# Patient Record
Sex: MALE | Race: WHITE | NOT HISPANIC OR LATINO | Employment: UNEMPLOYED | URBAN - METROPOLITAN AREA
[De-identification: names, ages, dates, MRNs, and addresses within clinical notes are randomized per-mention and may not be internally consistent; named-entity substitution may affect disease eponyms.]

---

## 2017-12-30 ENCOUNTER — OFFICE VISIT (OUTPATIENT)
Dept: URGENT CARE | Facility: PHYSICIAN GROUP | Age: 57
End: 2017-12-30

## 2017-12-30 VITALS
HEART RATE: 74 BPM | TEMPERATURE: 99.8 F | OXYGEN SATURATION: 96 % | RESPIRATION RATE: 16 BRPM | DIASTOLIC BLOOD PRESSURE: 84 MMHG | SYSTOLIC BLOOD PRESSURE: 108 MMHG

## 2017-12-30 DIAGNOSIS — M25.512 LEFT SHOULDER PAIN, UNSPECIFIED CHRONICITY: ICD-10-CM

## 2017-12-30 DIAGNOSIS — R21 RASH: ICD-10-CM

## 2017-12-30 DIAGNOSIS — J02.9 PHARYNGITIS, UNSPECIFIED ETIOLOGY: ICD-10-CM

## 2017-12-30 DIAGNOSIS — R07.9 CHEST PAIN, UNSPECIFIED TYPE: Primary | ICD-10-CM

## 2017-12-30 LAB
FLUAV+FLUBV AG SPEC QL IA: NORMAL
INT CON NEG: NORMAL
INT CON NEG: NORMAL
INT CON POS: NORMAL
INT CON POS: NORMAL
S PYO AG THROAT QL: NORMAL

## 2017-12-30 PROCEDURE — 87804 INFLUENZA ASSAY W/OPTIC: CPT | Performed by: PHYSICIAN ASSISTANT

## 2017-12-30 PROCEDURE — 99203 OFFICE O/P NEW LOW 30 MIN: CPT | Performed by: PHYSICIAN ASSISTANT

## 2017-12-30 PROCEDURE — 87880 STREP A ASSAY W/OPTIC: CPT | Performed by: PHYSICIAN ASSISTANT

## 2017-12-30 RX ORDER — TRIAMCINOLONE ACETONIDE 1 MG/G
1 OINTMENT TOPICAL 2 TIMES DAILY
Qty: 60 G | Refills: 1 | Status: SHIPPED | OUTPATIENT
Start: 2017-12-30 | End: 2018-01-13

## 2017-12-30 RX ORDER — HYDROXYZINE HYDROCHLORIDE 25 MG/1
25 TABLET, FILM COATED ORAL 3 TIMES DAILY PRN
Qty: 30 TAB | Refills: 0 | Status: SHIPPED | OUTPATIENT
Start: 2017-12-30

## 2017-12-30 RX ORDER — HYDROXYZINE HYDROCHLORIDE 25 MG/1
25 TABLET, FILM COATED ORAL 3 TIMES DAILY PRN
Qty: 30 TAB | Refills: 0 | Status: CANCELLED | OUTPATIENT
Start: 2017-12-30

## 2017-12-30 ASSESSMENT — ENCOUNTER SYMPTOMS
GASTROINTESTINAL NEGATIVE: 1
EYES NEGATIVE: 1
SORE THROAT: 1
COUGH: 1

## 2017-12-31 NOTE — PROGRESS NOTES
Subjective:      Luis Handley is a 57 y.o. male who presents with Chest Pain (C/o chest pain from front to back, pain radiating down LT arm with numbness, started yesterday but worse today.) and Rash (C/o rash on chest, back, legs and arms.  Slight itchiness, no pain x1 day)            HPI  Chief Complaint   Patient presents with   • Chest Pain     C/o chest pain from front to back, pain radiating down LT arm with numbness, started yesterday but worse today.   • Rash     C/o rash on chest, back, legs and arms.  Slight itchiness, no pain x1 day       HPI:  Luis Handley is a 57 y.o. male who presents with chest pain and left sided arm pain.  Rash on torso started yesterday, itchy.  Chest pain started around 1pm and has kristy worsening.  Lots arm pain.  Also having headache on left side.  Feeling sweaty.  Hx anxiety attacks.  Also having sore throat today.  Dizzy.  No runny nose.  Some body aches.  Run down.  Some cough.    No flu vaccine.    Patient denies fever, chills, or n/v/d.    Rash started yesterday for which Benadryl 4. Beginning to itch more today. Started on his chest now on his back. No change in medications or new medications.    Past Medical History:   Diagnosis Date   • Cancer (CMS-HCC)     testicular 1998   • Psychiatric disorder     anxiety       Past Surgical History:   Procedure Laterality Date   • STUMP REVISION Left 4/13/2016    Procedure: STUMP REVISION- Thumb ;  Surgeon: Clifford Weaver M.D.;  Location: SURGERY [facility];  Service:        History reviewed. No pertinent family history.  No pertinent family history.    Social History     Social History   • Marital status:      Spouse name: N/A   • Number of children: N/A   • Years of education: N/A     Occupational History   • Not on file.     Social History Main Topics   • Smoking status: Former Smoker   • Smokeless tobacco: Never Used   • Alcohol use Yes   • Drug use: No   • Sexual activity: Not on file     Other  "Topics Concern   • Not on file     Social History Narrative    ** Merged History Encounter **              Current Outpatient Prescriptions:   •  DiphenhydrAMINE HCl (BENADRYL PO), Take  by mouth.  •  omeprazole, 20 mg, Oral, DAILY, > 1 week at D/C  •  oxycodone-acetaminophen, 1 Tab, Oral, Q4HRS PRN  •  Ranitidine HCl (ZANTAC 75 PO), 1 Tab, Oral, BID, 4/13/2016 at 0800    Allergies   Allergen Reactions   • Pcn [Penicillins] Unspecified     Pt states \"As a child, I go into convulsions\".     • Morphine      \"sensitivity\"        Review of Systems   Constitutional: Positive for malaise/fatigue.   HENT: Positive for sore throat.    Eyes: Negative.    Respiratory: Positive for cough.    Cardiovascular: Positive for chest pain.   Gastrointestinal: Negative.    Genitourinary: Negative.    Skin: Positive for rash.          Objective:     /84   Pulse 74   Temp 37.7 °C (99.8 °F)   Resp 16   SpO2 96%      Physical Exam       Nursing note and vital signs reviewed.    Constitutional:  Poorly groomed, pleasant affect, and in no acute distress.    HEENT:  Head: Atruamatic, normocephalic.    Ears:  EAC with mild cerumen bilaterally, not erythematous.  TM’s pearly gray with cone of light present and umbo and malleolus visible bilaterally.  No bulging or fluid bubbles present in middle ear.    Eyes:  PERRLA, EOM's full, conjunctivae clear, fundi grossly normal, sclera white, conjunctiva not erythematous, and medial canthus without exudate bilaterally.    Nose:  Nares patent bilaterally.  Nasal mucosa edematous with clear rhinorrhea bilaterally.  Frontal and maxillary sinuses not tender to percussion.    Throat:  Oropharynx not erythematous, with no enlargement of the palatine tonsils bilaterally with no exudates.    Posterior oropharynx with cobblestoning and clear to white post nasal drainage present.  Soft palate rises symmetrically bilaterally and uvula midline.  Neck supple, with mild proximal anterior cervical chain " lymphadenopathy that is soft and mobile to palpation.      Neck: Neck supple, with mild anterior lymphadenopathy that is soft and mobile to palpation. Thyroid non-palpable without tenderness or nodules. No supraclavicular lymphadenopathy.    Lungs:  Respiratory effort not labored without accessory muscle use.  Lungs clear to auscultation bilaterally without wheezes, rales, or rhonchi.    Heart:  RRR, without murmurs rubs or gallops. Carotid arteries without bruits bilaterally. Radial and dorsalis pedis pulse 2+ bilaterally.  Chest nontender to palpation. No LE edema.    Abdomen:  Soft to palpation, no ttp.    Musculoskeletal:  Left shoulder:  No swelling, erythema, ecchymosis, effusion, or atrophy present.  TTP over the AC joint.    Limited ROM for extension, flexion, abduction, and adduction and internal rotation reaching to left hip and external rotation reaching to the occiput.    Strength 5/5 for resisted abduction, adduction, elbow flexion,  strength, and interdigital strength.  Impingement sign positive.  Adson's present.  Sensation intact to light touch C6-8.      Gait non-antalgic with a narrow base.    Derm:  Skin macular papular rough feeling rash to anterior torso and back. Lesions several millimeters in diameter. No scaling. Dry.    Psychiatric:  Mood, affect, and judgement appropriate.     EKG: NSR rate76:   , normal axis, normal intervals, no evidence of ischemia or hypertrophy.   Assessment/Plan:     1. Chest pain, unspecified type  EKG - Clinic performed    UC AMA/Refusal of Treatment   2. Pharyngitis, unspecified etiology  POCT Rapid Strep A    POCT Influenza A/B    EKG - Clinic performed   3. Rash  hydrOXYzine HCl (ATARAX) 25 MG Tab    triamcinolone acetonide (KENALOG) 0.1 % Ointment   4. Left shoulder pain, unspecified chronicity  REFERRAL TO ORTHOPEDICS      Patient presents with chest pain that he describes as sternal in nature with radiation to the back also with sore throat, dizziness, and  malaise that started yesterday. Rash also started yesterday that is pruritic. Chest pain with radiation to left arm but patient states he has been having ongoing left arm pain for some weeks that seems to be worsened with sleeping on that side loss of blood flow and numbness. On exam patient is poorly groomed but not acute appearing. Not diaphoretic. Somewhat flushed-appearing with a macular papular rash to chest and back. Heart is regular rate and rhythm. Oropharynx erythematous is some coryza with clear rhinorrhea noted. EKG reviewed by me with no ST elevation the rate of 76. No previous labs for comparison. Left arm pain with point tenderness over the AC joint and a positive Adson's, concerning for thoracic outlet syndrome vs mass occupying lesion/malignancy. Referred patient to orthopedics for further evaluation and management. Did discuss ER transfer as patient may be having an NSTEMI, and patient refused. Rapid flu and strep negative.     Advised patient to seek emergency room attention due to concern for crushing chest pain that patient has been experiencing for at least the last 4 hours. Patient refused to go to the ER, states he will go tomorrow. Had him sign AMA and discussed concerns for a non-ST elevation heart attack. Given the rash and left arm pain that has been going on for months, overall diagnosis is unclear. Plan to treat rash with topical steroid and Atarax.    Patient was in agreement with this treatment plan and seemed to understand without barriers. All questions were encouraged and answered.  Reviewed signs and symptoms of when to seek emergency medical care.     Please note that this dictation was created using voice recognition software.  I have made every reasonable attempt to correct obvious errors, but I expect there are errors of marisa and possibly content that I did not discover before finalizing the note.

## 2023-03-10 ENCOUNTER — APPOINTMENT (OUTPATIENT)
Dept: RADIOLOGY | Facility: MEDICAL CENTER | Age: 63
End: 2023-03-10
Attending: INTERNAL MEDICINE
Payer: OTHER GOVERNMENT

## 2023-03-10 ENCOUNTER — APPOINTMENT (OUTPATIENT)
Dept: RADIOLOGY | Facility: MEDICAL CENTER | Age: 63
End: 2023-03-10
Attending: EMERGENCY MEDICINE
Payer: OTHER GOVERNMENT

## 2023-03-10 ENCOUNTER — ANESTHESIA (OUTPATIENT)
Dept: SURGERY | Facility: MEDICAL CENTER | Age: 63
End: 2023-03-10
Payer: OTHER GOVERNMENT

## 2023-03-10 ENCOUNTER — HOSPITAL ENCOUNTER (OUTPATIENT)
Facility: MEDICAL CENTER | Age: 63
End: 2023-03-10
Attending: EMERGENCY MEDICINE | Admitting: STUDENT IN AN ORGANIZED HEALTH CARE EDUCATION/TRAINING PROGRAM
Payer: OTHER GOVERNMENT

## 2023-03-10 ENCOUNTER — ANESTHESIA EVENT (OUTPATIENT)
Dept: SURGERY | Facility: MEDICAL CENTER | Age: 63
End: 2023-03-10
Payer: OTHER GOVERNMENT

## 2023-03-10 VITALS
RESPIRATION RATE: 18 BRPM | DIASTOLIC BLOOD PRESSURE: 81 MMHG | HEART RATE: 76 BPM | TEMPERATURE: 98.1 F | BODY MASS INDEX: 27.07 KG/M2 | WEIGHT: 193.34 LBS | OXYGEN SATURATION: 90 % | SYSTOLIC BLOOD PRESSURE: 121 MMHG | HEIGHT: 71 IN

## 2023-03-10 DIAGNOSIS — T18.108A ESOPHAGEAL FOREIGN BODY, INITIAL ENCOUNTER: Primary | ICD-10-CM

## 2023-03-10 DIAGNOSIS — T18.128A CHICKEN BONE IN ESOPHAGUS: ICD-10-CM
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
## 2023-03-10 LAB
ANION GAP SERPL CALC-SCNC: 15 MMOL/L (ref 7–16)
BASOPHILS # BLD AUTO: 0.8 % (ref 0–1.8)
BASOPHILS # BLD: 0.05 K/UL (ref 0–0.12)
BUN SERPL-MCNC: 27 MG/DL (ref 8–22)
CALCIUM SERPL-MCNC: 10.6 MG/DL (ref 8.5–10.5)
CHLORIDE SERPL-SCNC: 100 MMOL/L (ref 96–112)
CO2 SERPL-SCNC: 21 MMOL/L (ref 20–33)
CREAT SERPL-MCNC: 0.86 MG/DL (ref 0.5–1.4)
EOSINOPHIL # BLD AUTO: 0.28 K/UL (ref 0–0.51)
EOSINOPHIL NFR BLD: 4.5 % (ref 0–6.9)
ERYTHROCYTE [DISTWIDTH] IN BLOOD BY AUTOMATED COUNT: 41.1 FL (ref 35.9–50)
GFR SERPLBLD CREATININE-BSD FMLA CKD-EPI: 97 ML/MIN/1.73 M 2
GLUCOSE SERPL-MCNC: 97 MG/DL (ref 65–99)
HCT VFR BLD AUTO: 46.9 % (ref 42–52)
HGB BLD-MCNC: 15.7 G/DL (ref 14–18)
IMM GRANULOCYTES # BLD AUTO: 0.01 K/UL (ref 0–0.11)
IMM GRANULOCYTES NFR BLD AUTO: 0.2 % (ref 0–0.9)
LYMPHOCYTES # BLD AUTO: 1.94 K/UL (ref 1–4.8)
LYMPHOCYTES NFR BLD: 31.3 % (ref 22–41)
MCH RBC QN AUTO: 29.2 PG (ref 27–33)
MCHC RBC AUTO-ENTMCNC: 33.5 G/DL (ref 33.7–35.3)
MCV RBC AUTO: 87.3 FL (ref 81.4–97.8)
MONOCYTES # BLD AUTO: 0.54 K/UL (ref 0–0.85)
MONOCYTES NFR BLD AUTO: 8.7 % (ref 0–13.4)
NEUTROPHILS # BLD AUTO: 3.37 K/UL (ref 1.82–7.42)
NEUTROPHILS NFR BLD: 54.5 % (ref 44–72)
NRBC # BLD AUTO: 0 K/UL
NRBC BLD-RTO: 0 /100 WBC
PATHOLOGY CONSULT NOTE: NORMAL
PLATELET # BLD AUTO: 169 K/UL (ref 164–446)
PMV BLD AUTO: 9.9 FL (ref 9–12.9)
POTASSIUM SERPL-SCNC: 4.2 MMOL/L (ref 3.6–5.5)
RBC # BLD AUTO: 5.37 M/UL (ref 4.7–6.1)
SODIUM SERPL-SCNC: 136 MMOL/L (ref 135–145)
WBC # BLD AUTO: 6.2 K/UL (ref 4.8–10.8)

## 2023-03-10 PROCEDURE — 160002 HCHG RECOVERY MINUTES (STAT): Performed by: SPECIALIST

## 2023-03-10 PROCEDURE — 71260 CT THORAX DX C+: CPT

## 2023-03-10 PROCEDURE — 88305 TISSUE EXAM BY PATHOLOGIST: CPT

## 2023-03-10 PROCEDURE — 99221 1ST HOSP IP/OBS SF/LOW 40: CPT | Mod: 25 | Performed by: SPECIALIST

## 2023-03-10 PROCEDURE — 99235 HOSP IP/OBS SAME DATE MOD 70: CPT | Performed by: STUDENT IN AN ORGANIZED HEALTH CARE EDUCATION/TRAINING PROGRAM

## 2023-03-10 PROCEDURE — 700117 HCHG RX CONTRAST REV CODE 255: Performed by: EMERGENCY MEDICINE

## 2023-03-10 PROCEDURE — 43239 EGD BIOPSY SINGLE/MULTIPLE: CPT | Performed by: SPECIALIST

## 2023-03-10 PROCEDURE — 700111 HCHG RX REV CODE 636 W/ 250 OVERRIDE (IP): Performed by: ANESTHESIOLOGY

## 2023-03-10 PROCEDURE — 700105 HCHG RX REV CODE 258: Performed by: ANESTHESIOLOGY

## 2023-03-10 PROCEDURE — 160203 HCHG ENDO MINUTES - 1ST 30 MINS LEVEL 4: Performed by: SPECIALIST

## 2023-03-10 PROCEDURE — G0378 HOSPITAL OBSERVATION PER HR: HCPCS

## 2023-03-10 PROCEDURE — 700105 HCHG RX REV CODE 258: Performed by: STUDENT IN AN ORGANIZED HEALTH CARE EDUCATION/TRAINING PROGRAM

## 2023-03-10 PROCEDURE — 160048 HCHG OR STATISTICAL LEVEL 1-5: Performed by: SPECIALIST

## 2023-03-10 PROCEDURE — 160035 HCHG PACU - 1ST 60 MINS PHASE I: Performed by: SPECIALIST

## 2023-03-10 PROCEDURE — 00731 ANES UPR GI NDSC PX NOS: CPT | Performed by: ANESTHESIOLOGY

## 2023-03-10 PROCEDURE — 70490 CT SOFT TISSUE NECK W/O DYE: CPT

## 2023-03-10 PROCEDURE — 80048 BASIC METABOLIC PNL TOTAL CA: CPT

## 2023-03-10 PROCEDURE — 700101 HCHG RX REV CODE 250: Performed by: ANESTHESIOLOGY

## 2023-03-10 PROCEDURE — 36415 COLL VENOUS BLD VENIPUNCTURE: CPT

## 2023-03-10 PROCEDURE — 160009 HCHG ANES TIME/MIN: Performed by: SPECIALIST

## 2023-03-10 PROCEDURE — 85025 COMPLETE CBC W/AUTO DIFF WBC: CPT

## 2023-03-10 PROCEDURE — 160208 HCHG ENDO MINUTES - EA ADDL 1 MIN LEVEL 4: Performed by: SPECIALIST

## 2023-03-10 PROCEDURE — 99285 EMERGENCY DEPT VISIT HI MDM: CPT

## 2023-03-10 RX ORDER — MIDAZOLAM HYDROCHLORIDE 1 MG/ML
INJECTION INTRAMUSCULAR; INTRAVENOUS PRN
Status: DISCONTINUED | OUTPATIENT
Start: 2023-03-10 | End: 2023-03-10 | Stop reason: SURG

## 2023-03-10 RX ORDER — PROMETHAZINE HYDROCHLORIDE 25 MG/1
12.5-25 SUPPOSITORY RECTAL EVERY 4 HOURS PRN
Status: DISCONTINUED | OUTPATIENT
Start: 2023-03-10 | End: 2023-03-10 | Stop reason: HOSPADM

## 2023-03-10 RX ORDER — PROCHLORPERAZINE EDISYLATE 5 MG/ML
5-10 INJECTION INTRAMUSCULAR; INTRAVENOUS EVERY 4 HOURS PRN
Status: DISCONTINUED | OUTPATIENT
Start: 2023-03-10 | End: 2023-03-10 | Stop reason: HOSPADM

## 2023-03-10 RX ORDER — SODIUM CHLORIDE 9 MG/ML
INJECTION, SOLUTION INTRAVENOUS CONTINUOUS
Status: DISCONTINUED | OUTPATIENT
Start: 2023-03-10 | End: 2023-03-10 | Stop reason: HOSPADM

## 2023-03-10 RX ORDER — ONDANSETRON 4 MG/1
4 TABLET, ORALLY DISINTEGRATING ORAL EVERY 4 HOURS PRN
Status: DISCONTINUED | OUTPATIENT
Start: 2023-03-10 | End: 2023-03-10 | Stop reason: HOSPADM

## 2023-03-10 RX ORDER — METOPROLOL TARTRATE 1 MG/ML
INJECTION, SOLUTION INTRAVENOUS PRN
Status: DISCONTINUED | OUTPATIENT
Start: 2023-03-10 | End: 2023-03-10 | Stop reason: SURG

## 2023-03-10 RX ORDER — HYDRALAZINE HYDROCHLORIDE 20 MG/ML
10 INJECTION INTRAMUSCULAR; INTRAVENOUS EVERY 6 HOURS PRN
Status: DISCONTINUED | OUTPATIENT
Start: 2023-03-10 | End: 2023-03-10 | Stop reason: HOSPADM

## 2023-03-10 RX ORDER — ONDANSETRON 2 MG/ML
4 INJECTION INTRAMUSCULAR; INTRAVENOUS EVERY 4 HOURS PRN
Status: DISCONTINUED | OUTPATIENT
Start: 2023-03-10 | End: 2023-03-10 | Stop reason: HOSPADM

## 2023-03-10 RX ORDER — SODIUM CHLORIDE, SODIUM LACTATE, POTASSIUM CHLORIDE, CALCIUM CHLORIDE 600; 310; 30; 20 MG/100ML; MG/100ML; MG/100ML; MG/100ML
INJECTION, SOLUTION INTRAVENOUS
Status: DISCONTINUED | OUTPATIENT
Start: 2023-03-10 | End: 2023-03-10 | Stop reason: SURG

## 2023-03-10 RX ORDER — PROMETHAZINE HYDROCHLORIDE 25 MG/1
12.5-25 TABLET ORAL EVERY 4 HOURS PRN
Status: DISCONTINUED | OUTPATIENT
Start: 2023-03-10 | End: 2023-03-10 | Stop reason: HOSPADM

## 2023-03-10 RX ORDER — DEXAMETHASONE SODIUM PHOSPHATE 4 MG/ML
INJECTION, SOLUTION INTRA-ARTICULAR; INTRALESIONAL; INTRAMUSCULAR; INTRAVENOUS; SOFT TISSUE PRN
Status: DISCONTINUED | OUTPATIENT
Start: 2023-03-10 | End: 2023-03-10 | Stop reason: SURG

## 2023-03-10 RX ADMIN — SODIUM CHLORIDE: 9 INJECTION, SOLUTION INTRAVENOUS at 05:58

## 2023-03-10 RX ADMIN — ROCURONIUM BROMIDE 10 MG: 10 INJECTION, SOLUTION INTRAVENOUS at 08:39

## 2023-03-10 RX ADMIN — DEXAMETHASONE SODIUM PHOSPHATE 8 MG: 4 INJECTION, SOLUTION INTRA-ARTICULAR; INTRALESIONAL; INTRAMUSCULAR; INTRAVENOUS; SOFT TISSUE at 08:50

## 2023-03-10 RX ADMIN — IOHEXOL 85 ML: 350 INJECTION, SOLUTION INTRAVENOUS at 05:15

## 2023-03-10 RX ADMIN — SODIUM CHLORIDE, POTASSIUM CHLORIDE, SODIUM LACTATE AND CALCIUM CHLORIDE: 600; 310; 30; 20 INJECTION, SOLUTION INTRAVENOUS at 08:31

## 2023-03-10 RX ADMIN — MIDAZOLAM HYDROCHLORIDE 2 MG: 1 INJECTION, SOLUTION INTRAMUSCULAR; INTRAVENOUS at 08:35

## 2023-03-10 RX ADMIN — METOPROLOL TARTRATE 2 MG: 5 INJECTION, SOLUTION INTRAVENOUS at 08:42

## 2023-03-10 RX ADMIN — PROPOFOL 120 MG: 10 INJECTION, EMULSION INTRAVENOUS at 08:39

## 2023-03-10 RX ADMIN — SUGAMMADEX 200 MG: 100 INJECTION, SOLUTION INTRAVENOUS at 08:56

## 2023-03-10 RX ADMIN — ROCURONIUM BROMIDE 10 MG: 10 INJECTION, SOLUTION INTRAVENOUS at 08:38

## 2023-03-10 RX ADMIN — Medication 80 MG: at 08:39

## 2023-03-10 ASSESSMENT — COGNITIVE AND FUNCTIONAL STATUS - GENERAL
SUGGESTED CMS G CODE MODIFIER DAILY ACTIVITY: CH
SUGGESTED CMS G CODE MODIFIER MOBILITY: CH
DAILY ACTIVITIY SCORE: 24
MOBILITY SCORE: 24

## 2023-03-10 ASSESSMENT — LIFESTYLE VARIABLES
DOES PATIENT WANT TO STOP DRINKING: NO
ON A TYPICAL DAY WHEN YOU DRINK ALCOHOL HOW MANY DRINKS DO YOU HAVE: 4
AVERAGE NUMBER OF DAYS PER WEEK YOU HAVE A DRINK CONTAINING ALCOHOL: 0
EVER HAD A DRINK FIRST THING IN THE MORNING TO STEADY YOUR NERVES TO GET RID OF A HANGOVER: NO
EVER FELT BAD OR GUILTY ABOUT YOUR DRINKING: NO
TOTAL SCORE: 0
HAVE PEOPLE ANNOYED YOU BY CRITICIZING YOUR DRINKING: NO
HAVE YOU EVER FELT YOU SHOULD CUT DOWN ON YOUR DRINKING: NO
CONSUMPTION TOTAL: NEGATIVE
TOTAL SCORE: 0
TOTAL SCORE: 0
ALCOHOL_USE: YES
HOW MANY TIMES IN THE PAST YEAR HAVE YOU HAD 5 OR MORE DRINKS IN A DAY: 0

## 2023-03-10 ASSESSMENT — PAIN SCALES - GENERAL: PAIN_LEVEL: 0

## 2023-03-10 ASSESSMENT — PAIN DESCRIPTION - PAIN TYPE
TYPE: SURGICAL PAIN
TYPE: ACUTE PAIN
TYPE: SURGICAL PAIN
TYPE: ACUTE PAIN

## 2023-03-10 ASSESSMENT — ENCOUNTER SYMPTOMS
NAUSEA: 0
HEARTBURN: 0
MYALGIAS: 0
DIZZINESS: 0
BRUISES/BLEEDS EASILY: 0
DOUBLE VISION: 0
HEMOPTYSIS: 0
BLURRED VISION: 0
CHILLS: 0
NECK PAIN: 0
DEPRESSION: 0
PALPITATIONS: 0
FEVER: 0
COUGH: 0
HEADACHES: 0

## 2023-03-10 ASSESSMENT — PATIENT HEALTH QUESTIONNAIRE - PHQ9
2. FEELING DOWN, DEPRESSED, IRRITABLE, OR HOPELESS: NOT AT ALL
1. LITTLE INTEREST OR PLEASURE IN DOING THINGS: NOT AT ALL
SUM OF ALL RESPONSES TO PHQ9 QUESTIONS 1 AND 2: 0

## 2023-03-10 NOTE — CARE PLAN
The patient is Stable - Low risk of patient condition declining or worsening    Shift Goals  Clinical Goals: plan of care updates  Patient Goals: plan of care updates    Progress made toward(s) clinical / shift goals:  patient updated on POC    Patient is not progressing towards the following goals:

## 2023-03-10 NOTE — PROCEDURES
OPERATIVE REPORT    PATIENT:   Luis Handley   1960       PREOPERATIVE DIAGNOSES/INDICATIONS: Food impaction    POSTOPERATIVE DIAGNOSIS: Longitudinal furrows in esophagus    PROCEDURE:  ESOPHAGOGASTRODUODENOSCOPY    PHYSICIAN:  Elba Grande MD    ANESTHESIA:  Per anesthesiologist.    LOCATION: [facility]    CONSENT:  OBTAINED. The risks, benefits and alternatives of the procedure were discussed in details. The risks include and are not limited to bleeding, infection, perforation, missed lesions, and sedations risks (cardiopulmonary compromise and allergic reaction to medications).    DESCRIPTION: The patient presented to the procedure room.  After routine checkup was performed, patient was brought into the endoscopy suite.  Patient was placed on his left lateral decubitus position. A bite block was placed in patient's mouth. Patient was sedated by anesthesia.  Vital signs were monitored throughout the procedure.  Oxygenation support was provided throughout the procedure. Upper endoscope was inserted into patient's mouth and advanced to the second portion of the duodenum under direct visualization.      Once the site was reached and examined, the upper endoscope was withdrawn.  Retroflexion was made within the stomach.  The stomach was decompressed, scope was withdrawn and the procedure was terminated.    The patient tolerated the procedure well.  There were no immediate complications.    OPERATIVE FINDINGS:    1. Esophagus:longitudinal furrows and one tongue of salmon colored mucosa. Biopsy  2. Stomach: Normal  3. Duodenum: Normal    IMPRESSION/RECOMMENDATIONS:  I tried to look in oropharynx as much as I could and I did not see anything. I obviously cannot spend a lot of time there. If he still has sensation, consult ENT. I did not see ulceration or irritation either    This note has been transcribed with digital voice recognition software and although it has been reviewed may contain grammatical or  word errors

## 2023-03-10 NOTE — ED TRIAGE NOTES
Luis Handley  62 y.o. male    Chief Complaint   Patient presents with    Other     Pt arrives with complaints of sensation of food stuck in esophagus. Pt was sent from [place] ED. Pt states he was eating dinner and accidentally ate a chicken bone- was able to get bone up out of esophagus but states he feels food is still stuck. Pt states he spit up some blood.     Pt has been able to drink water without issue since swallowing chicken bone. Speaking in full sentences. No distress.     Vitals:    03/10/23 0135   BP: (!) 140/91   Pulse: 84   Resp: 16   Temp: 36.9 °C (98.5 °F)   SpO2: 94%       Triage process explained to patient, apologized for wait time, and returned to lobby.  Pt informed to notify staff of any change in condition.

## 2023-03-10 NOTE — PROGRESS NOTES
4 Eyes Skin Assessment Completed by BRIAN Lindsey and BRIAN Lauren.    Head WDL  Ears WDL  Nose WDL  Mouth WDL  Neck WDL  Breast/Chest WDL  Shoulder Blades WDL  Spine WDL  (R) Arm/Elbow/Hand scattered scabs  (L) Arm/Elbow/Hand scattered scabs  Abdomen WDL  Groin Scar  Scrotum/Coccyx/Buttocks WDL  (R) Leg scattered scabs  (L) Leg scattered scabs  (R) Heel/Foot/Toe WDL  (L) Heel/Foot/Toe WDL          Devices In Places Blood Pressure Cuff and Pulse Ox      Interventions In Place Pillows    Possible Skin Injury No    Pictures Uploaded Into Epic N/A  Wound Consult Placed N/A  RN Wound Prevention Protocol Ordered No

## 2023-03-10 NOTE — CONSULTS
GASTROENTEROLOGY CONSULTATION    PATIENT NAME: Luis Handley  : 1960  CSN: [id number]  MRN:  [id number]     CONSULTATION DATE:  3/10/2023    PRIMARY CARE PROVIDER:  Pcp Pt States None      REASON FOR CONSULT:  Food impaction    HISTORY OF PRESENT ILLNESS:  Luis Handley is a 62 y.o. male who presents to the Emergency Department for evaluation after accidentally eating a chicken bone onset prior to arrival tonight. Patient reports that he initially reported to the [place] ED but was advised to report to the [facility] ED. He states that he was eating dinner earlier when he swallowed a small piece of chicken bone. He was able to cough up the bone, and continued to eat his chicken. He states that an hour or two went by, and he began to cough producing two dime sized blood clots. He describes them as bright red. Patient reports associated left sided throat discomfort, stating that this is below his chin. He denies any new chest pains. Luis reports that he has a chronic cough and chest pains from his smoking history. He has a history of acid reflux. He has taken PPI for years and years, but has never had a food impaction. 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 He has scar externally    PAST MEDICAL HISTORY:  Past Medical History:   Diagnosis Date    Cancer (HCC)     testicular 1998    Psychiatric disorder     anxiety       PAST SURGICAL HISTORY:  Past Surgical History:   Procedure Laterality Date    STUMP REVISION Left 2016    Procedure: STUMP REVISION- Thumb ;  Surgeon: Clifford Weaver M.D.;  Location: SURGERY [facility];  Service:         CURRENT MEDS:  Current Facility-Administered Medications   Medication Dose Route Frequency Provider Last Rate Last Admin    NS infusion   Intravenous Continuous Peter Bryant M.D. 100 mL/hr at 03/10/23 0558 New Bag at 03/10/23 0558    [MAR Hold] ondansetron (ZOFRAN) syringe/vial injection 4 mg  4 mg Intravenous  "Q4HRS PRALDO Bryant M.D.        [MAR Hold] ondansetron (ZOFRAN ODT) dispertab 4 mg  4 mg Oral Q4HRS ALISSA Bryant M.D.        [MAR Hold] promethazine (PHENERGAN) tablet 12.5-25 mg  12.5-25 mg Oral Q4HRS ALISSA Bryant M.D.        [MAR Hold] promethazine (PHENERGAN) suppository 12.5-25 mg  12.5-25 mg Rectal Q4HRS ALISSA Bryant M.D.        [MAR Hold] prochlorperazine (COMPAZINE) injection 5-10 mg  5-10 mg Intravenous Q4HRS ALISSA Bryant M.D.        [MAR Hold] hydrALAZINE (APRESOLINE) injection 10 mg  10 mg Intravenous Q6HRS ALISSA Bryant M.D.            ALLERGIES:  Allergies   Allergen Reactions    Pcn [Penicillins] Unspecified     Pt states \"As a child, I go into convulsions\".      Gluten Meal     Morphine      \"sensitivity\"       SOCIAL HISTORY:  Social History     Socioeconomic History    Marital status:      Spouse name: Not on file    Number of children: Not on file    Years of education: Not on file    Highest education level: Not on file   Occupational History    Not on file   Tobacco Use    Smoking status: Former    Smokeless tobacco: Never   Substance and Sexual Activity    Alcohol use: Yes    Drug use: No    Sexual activity: Not on file   Other Topics Concern    Not on file   Social History Narrative    ** Merged History Encounter **          Social Determinants of Health     Financial Resource Strain: Not on file   Food Insecurity: Not on file   Transportation Needs: Not on file   Physical Activity: Not on file   Stress: Not on file   Social Connections: Not on file   Intimate Partner Violence: Not on file   Housing Stability: Not on file       FAMILY HISTORY:  History reviewed. No pertinent family history.     REVIEW OF SYSTEMS:  General ROS: Negative for - chills, fever, night sweats or weight loss.  HEENT ROS: Negative  Respiratory ROS: Negative for - cough or shortness of breath.  Cardiovascular ROS:  Negative for - chest pain or palpitations.  Gastrointestinal " "ROS: As per the history of present illness.  Genito-Urinary ROS: Negative  Musculoskeletal ROS: Negative.  Neurological ROS: Negative  Skin ROS: negative  Hematology ROS: negative  Endocrinology ROS: Negative        PHYSICAL EXAM:  VITALS: /88   Pulse 78   Temp 37 °C (98.6 °F) (Temporal)   Resp 20   Ht 1.803 m (5' 11\")   Wt 87.7 kg (193 lb 5.5 oz)   SpO2 93%   BMI 26.97 kg/m²   GEN:  Luis Handley is a 62 y.o. male in no acute distress.  HEENT: Mucous membranes pink and moist.  Sclera anicteric.    NECK:    Neck supple without lymphadenopathy or thyromegaly.  LUNGS: Clear to auscultation posteriorly.  HEART: Regular rate and rhythm. S1 and S2 normal. No murmurs, gallops  ABD:  +BS nt\nd -hsm  RECTAL: Not done at this time.  EXT:  Without cyanosis, deformity or pitting edema.  SKIN:  Pink, warm, dry.  NEURO: Grossly intact, A/OR.    LABS:  Recent Labs     03/10/23  0406   WBC 6.2   MCV 87.3     Recent Labs     03/10/23  0406   GLUCOSE 97   BUN 27*   CO2 21     No results found for: INR, PT  No components found for: ALT, AST, GGT, ALKPHOS  No results found for: BILKAMLESHO      [id number]@     @[id number]@       IMPRESSION/PLAN:  Food impaction  EGD with retrieval of food  Area of discomfort is high in throat and he may require intubation to protect the airway for safe removal.        Elba Grande M.D.  Gastroenterology      "

## 2023-03-10 NOTE — CONSULTS
"Date of Service:  3/10/23    PCP: Pcp Pt States None    CC:  Possible foreign body     HPI: This is a 62 y.o. male who choked on some chicken with a bone last night.  He is able to cough the bone.  Since then he's had a globus sensation.  He underwent a normal EGD today with GI.  They recommended ENT evaluation.  He does have a mild globus sensation.  No chest pain or back pain.  Normal CT neck without contrast.    ROS: As above. The remainder of a complete review of systems is negative in all systems except as noted.    PMHx:  Active Ambulatory Problems     Diagnosis Date Noted    No Active Ambulatory Problems     Resolved Ambulatory Problems     Diagnosis Date Noted    No Resolved Ambulatory Problems     Past Medical History:   Diagnosis Date    Cancer (HCC)     Psychiatric disorder        SHx:  Social History     Socioeconomic History    Marital status:      Spouse name: Not on file    Number of children: Not on file    Years of education: Not on file    Highest education level: Not on file   Occupational History    Not on file   Tobacco Use    Smoking status: Former    Smokeless tobacco: Never   Substance and Sexual Activity    Alcohol use: Yes    Drug use: No    Sexual activity: Not on file   Other Topics Concern    Not on file   Social History Narrative    ** Merged History Encounter **          Social Determinants of Health     Financial Resource Strain: Not on file   Food Insecurity: Not on file   Transportation Needs: Not on file   Physical Activity: Not on file   Stress: Not on file   Social Connections: Not on file   Intimate Partner Violence: Not on file   Housing Stability: Not on file       FHx:  family history is not on file.    Allergies:  Allergies   Allergen Reactions    Pcn [Penicillins] Unspecified     Pt states \"As a child, I go into convulsions\".      Gluten Meal     Morphine      \"sensitivity\"       Medications:  No current facility-administered medications on file prior to encounter. "     Current Outpatient Medications on File Prior to Encounter   Medication Sig Dispense Refill    gabapentin (NEURONTIN) 300 MG Cap Take 1 Capsule by mouth 3 times a day. Start with 300mg PO nightly, Day 2 start 300mg PO BID, Day 7 start 300mg PO TID, then increase each dose (one at a time) every 5 days by 300mg to 900mg TID maximum 90 Capsule 1    DiphenhydrAMINE HCl (BENADRYL PO) Take  by mouth.      hydrOXYzine HCl (ATARAX) 25 MG Tab Take 1 Tab by mouth 3 times a day as needed for Anxiety. 30 Tab 0    omeprazole (PRILOSEC) 20 MG delayed-release capsule Take 20 mg by mouth every day.      oxycodone-acetaminophen (PERCOCET) 7.5-325 MG per tablet Take 1 Tab by mouth every four hours as needed. 60 Tab 0    Ranitidine HCl (ZANTAC 75 PO) Take 1 Tab by mouth 2 Times a Day.         Objective Exam:  Vitals:    03/10/23 1100 03/10/23 1128 03/10/23 1130 03/10/23 1156   BP: 120/83 119/82 119/82 125/83   Pulse: 67 67 68 71   Resp:  17  18   Temp:  36.9 °C (98.4 °F)  36.6 °C (97.9 °F)   TempSrc:  Temporal  Temporal   SpO2: 94% 92% 92% 92%   Weight:       Height:           General: no acute distress.  Normal voice.  No stridor.  Full neck range of motion.  Flat neck.  No crepitus.  Normal R cavity and oropharynx.    Procedure after verbal consent, flexible nasopharyngoscope was introduced through the left nostril.  He has a right septal deviation.  Normal nasopharynx pain some tongue epiglottis vocal folds false vocal folds   Subglottis, piriform sinuses, arytenoids.  No foreign body seen    Laboratory--reviewed personally and are as follows:  Lab Results   Component Value Date/Time    WBC 6.2 03/10/2023 04:06 AM    RBC 5.37 03/10/2023 04:06 AM    HEMOGLOBIN 15.7 03/10/2023 04:06 AM    HEMATOCRIT 46.9 03/10/2023 04:06 AM    MCV 87.3 03/10/2023 04:06 AM    MCH 29.2 03/10/2023 04:06 AM    MCHC 33.5 (L) 03/10/2023 04:06 AM    MPV 9.9 03/10/2023 04:06 AM    NEUTSPOLYS 54.50 03/10/2023 04:06 AM    LYMPHOCYTES 31.30 03/10/2023 04:06  AM    MONOCYTES 8.70 03/10/2023 04:06 AM    EOSINOPHILS 4.50 03/10/2023 04:06 AM    BASOPHILS 0.80 03/10/2023 04:06 AM      Lab Results   Component Value Date/Time    SODIUM 136 03/10/2023 04:06 AM    POTASSIUM 4.2 03/10/2023 04:06 AM    CHLORIDE 100 03/10/2023 04:06 AM    CO2 21 03/10/2023 04:06 AM    GLUCOSE 97 03/10/2023 04:06 AM    BUN 27 (H) 03/10/2023 04:06 AM    CREATININE 0.86 03/10/2023 04:06 AM    CREATININE 0.9 02/06/2009 11:09 AM      No results found for: PROTHROMBTM, INR     Radiology  CT neck negative for foreign body    MDM:  62 y.o. male here with globus sensation after a traumatic swallowing episode.  Normal flexible laryngoscopy at the bedside just now.  Normal CT neck without contrast, normal EGD.  At this time aI would not recommend laryngoscopy in the operating room.  He does return the emergency room if his symptoms worsen.  Follow up as needed    Discussed with Dr. Mejia

## 2023-03-10 NOTE — ANESTHESIA PROCEDURE NOTES
"Airway    Date/Time: 3/10/2023 8:41 AM  Performed by: Velia Rhoades M.D.  Authorized by: Velia Rhoades M.D.     Location:  OR  Urgency:  Elective  Difficult Airway: No    Indications for Airway Management:  Anesthesia      Spontaneous Ventilation: absent    Sedation Level:  Deep  Preoxygenated: Yes    Patient Position:  Ramp (LLD)  Mask Difficulty Assessment:  0 - not attempted  Final Airway Type:  Endotracheal airway  Final Endotracheal Airway:  ETT  Cuffed: Yes    Technique Used for Successful ETT Placement:  Direct laryngoscopy  Devices/Methods Used in Placement:  Cricoid pressure and intubating stylet    Insertion Site:  Oral  Blade Type:  Wyatt  Laryngoscope Blade/Videolaryngoscope Blade Size:  4  ETT Size (mm):  7.0  Measured from:  Lips  ETT to Lips (cm):  22  Placement Verified by: capnometry    Cormack-Lehane Classification:  Grade I - full view of glottis  Number of Attempts at Approach:  1  Ventilation Between Attempts:  None  Number of Other Approaches Attempted:  0   Patient with \"chicken bone stuck in my throat\" with dysphagia; ASA monitors on. +preO2; Patient in LLD position with head of bed elevated 30 degrees.   Smooth rapid sequence induction, eyes taped and atraumatic intubation with vocal cords relaxed; no sign of chicken bone. Cuff of ETT just passed vocal cords and inflated with 12 cc air. +ETCO2.  ETT taped at right upper lip and GI bite block placed.       "

## 2023-03-10 NOTE — ANESTHESIA POSTPROCEDURE EVALUATION
Patient: Luis Handley    Procedure Summary     Date: 03/10/23 Room / Location: [facility] ROOM 26 / SURGERY SAME DAY [facility]    Anesthesia Start: 0831 Anesthesia Stop: 0911    Procedures:       GASTROSCOPY with FOOD IMPACTION (Esophagus)      GASTROSCOPY, WITH BIOPSY (Esophagus) Diagnosis: (Esophagitis)    Surgeons: Elba Grande M.D. Responsible Provider: Velia Rhoades M.D.    Anesthesia Type: general ASA Status: 2          Final Anesthesia Type: general  Last vitals  BP   Blood Pressure: 120/83    Temp   36.9 °C (98.4 °F)    Pulse   67   Resp   17    SpO2   94 %      Anesthesia Post Evaluation    Patient location during evaluation: PACU  Patient participation: complete - patient participated  Level of consciousness: awake  Pain score: 0    Airway patency: patent  Anesthetic complications: no  Cardiovascular status: adequate  Respiratory status: acceptable  Hydration status: acceptable    PONV: none          No notable events documented.     Nurse Pain Score: 2 (NPRS)

## 2023-03-10 NOTE — ANESTHESIA TIME REPORT
Anesthesia Start and Stop Event Times     Date Time Event    3/10/2023 0828 Ready for Procedure     0831 Anesthesia Start     0911 Anesthesia Stop        Responsible Staff  03/10/23    Name Role Begin End    Velia Rhoades M.D. Anesth 0831 0911        Overtime Reason:  no overtime (within assigned shift)    Comments:

## 2023-03-10 NOTE — ED PROVIDER NOTES
ED Provider Note    Scribed for Taye Hill by Zhao King. 3/10/2023  2:43 AM    Primary care provider: Pcp Pt States None  Means of arrival: Walk-in  History obtained from: Patient  History limited by: None    CHIEF COMPLAINT  Chief Complaint   Patient presents with    Other     EXTERNAL RECORDS REVIEWED  Outpatient Notes seen at the [facility] in November 2022 for lumbar pain    HPI/ROS  LIMITATION TO HISTORY   Select: : None  OUTSIDE HISTORIAN(S):  None.    HPI  Luis Handley is a 62 y.o. male who presents to the Emergency Department for evaluation after accidentally eating a chicken bone onset prior to arrival tonight. Patient reports that he initially reported to the [facility] ED but was advised to report to the [facility] ED. He states that he was eating dinner earlier when he swallowed a small piece of chicken bone. He was able to cough up the bone, and continued to eat his chicken. He states that an hour or two went by, and he began to cough producing two dime sized blood clots. He describes them as bright red. Patient reports associated left sided throat discomfort, stating that this is below his chin. He denies any new chest pains. Luis reports that he has a chronic cough and chest pains from his smoking history.       REVIEW OF SYSTEMS  As above, all other systems reviewed and are negative.   See HPI for further details.     PAST MEDICAL HISTORY   has a past medical history of Cancer (HCC) and Psychiatric disorder.  SURGICAL HISTORY   has a past surgical history that includes stump revision (Left, 4/13/2016).  SOCIAL HISTORY  Social History     Tobacco Use    Smoking status: Former    Smokeless tobacco: Never   Substance Use Topics    Alcohol use: Yes    Drug use: No      Social History     Substance and Sexual Activity   Drug Use No     FAMILY HISTORY  History reviewed. No pertinent family history.  CURRENT MEDICATIONS  Home Medications       Reviewed by Dari Weinstein R.N.  "(Registered Nurse) on 03/10/23 at 0207  Med List Status: Not Addressed     Medication Last Dose Status   DiphenhydrAMINE HCl (BENADRYL PO)  Active   gabapentin (NEURONTIN) 300 MG Cap  Active   hydrOXYzine HCl (ATARAX) 25 MG Tab  Active   omeprazole (PRILOSEC) 20 MG delayed-release capsule  Active   oxycodone-acetaminophen (PERCOCET) 7.5-325 MG per tablet  Active   Ranitidine HCl (ZANTAC 75 PO)  Active                  ALLERGIES  Allergies   Allergen Reactions    Pcn [Penicillins] Unspecified     Pt states \"As a child, I go into convulsions\".      Gluten Meal     Morphine      \"sensitivity\"       PHYSICAL EXAM    VITAL SIGNS:   Vitals:    03/10/23 0200 03/10/23 0232 03/10/23 0236 03/10/23 0411   BP:  (!) 156/92  (!) 157/100   Pulse:  71 71 79   Resp:   19    Temp:       TempSrc:       SpO2:  94% 94% 93%   Weight: 90.3 kg (199 lb 1.2 oz)      Height:         Vitals: My interpretation: Hypertensive, not tachycardic, afebrile, not hypoxic    Reinterpretation of vitals: Unchanged    PE:   Constitutional: Well developed, Well nourished, No acute distress, Non-toxic appearance.   HENT: Normocephalic, Atraumatic, Bilateral external ears normal, Oropharynx is clear mucous membranes are moist. No oral exudates or nasal discharge. Benign unremarkable posterior oropharynx, No foreign bodies visualized, Tolerating secretions well.  Eyes: Pupils are equal round and reactive, EOMI, Conjunctiva normal, No discharge.   Neck: Normal range of motion, No tenderness, Supple, No stridor. No meningismus.  Lymphatic: No lymphadenopathy noted.   Cardiovascular: Regular rate and rhythm without murmur rub or gallop.  Thorax & Lungs: Clear breath sounds bilaterally without wheezes, rhonchi or rales. There is no chest wall tenderness.   Abdomen: Soft non-tender non-distended. There is no rebound or guarding. No organomegaly is appreciated. Bowel sounds are normal.  Skin: Normal without rash.   Back: No CVA or spinal tenderness.   Extremities: " Intact distal pulses, No edema, No tenderness, No cyanosis, No clubbing. Capillary refill is less than 2 seconds.  Musculoskeletal: Good range of motion in all major joints. No tenderness to palpation or major deformities noted.   Neurologic: Alert & oriented x 3, Normal motor function, Normal sensory function, No focal deficits noted. Reflexes are normal.  Psychiatric: Affect normal, Judgment normal, Mood normal. There is no suicidal ideation or patient reported hallucinations.     DIAGNOSTIC STUDIES / PROCEDURES    LABS  Results for orders placed or performed during the hospital encounter of 03/10/23   CBC WITH DIFFERENTIAL   Result Value Ref Range    WBC 6.2 4.8 - 10.8 K/uL    RBC 5.37 4.70 - 6.10 M/uL    Hemoglobin 15.7 14.0 - 18.0 g/dL    Hematocrit 46.9 42.0 - 52.0 %    MCV 87.3 81.4 - 97.8 fL    MCH 29.2 27.0 - 33.0 pg    MCHC 33.5 (L) 33.7 - 35.3 g/dL    RDW 41.1 35.9 - 50.0 fL    Platelet Count 169 164 - 446 K/uL    MPV 9.9 9.0 - 12.9 fL    Neutrophils-Polys 54.50 44.00 - 72.00 %    Lymphocytes 31.30 22.00 - 41.00 %    Monocytes 8.70 0.00 - 13.40 %    Eosinophils 4.50 0.00 - 6.90 %    Basophils 0.80 0.00 - 1.80 %    Immature Granulocytes 0.20 0.00 - 0.90 %    Nucleated RBC 0.00 /100 WBC    Neutrophils (Absolute) 3.37 1.82 - 7.42 K/uL    Lymphs (Absolute) 1.94 1.00 - 4.80 K/uL    Monos (Absolute) 0.54 0.00 - 0.85 K/uL    Eos (Absolute) 0.28 0.00 - 0.51 K/uL    Baso (Absolute) 0.05 0.00 - 0.12 K/uL    Immature Granulocytes (abs) 0.01 0.00 - 0.11 K/uL    NRBC (Absolute) 0.00 K/uL   BASIC METABOLIC PANEL   Result Value Ref Range    Sodium 136 135 - 145 mmol/L    Potassium 4.2 3.6 - 5.5 mmol/L    Chloride 100 96 - 112 mmol/L    Co2 21 20 - 33 mmol/L    Glucose 97 65 - 99 mg/dL    Bun 27 (H) 8 - 22 mg/dL    Creatinine 0.86 0.50 - 1.40 mg/dL    Calcium 10.6 (H) 8.5 - 10.5 mg/dL    Anion Gap 15.0 7.0 - 16.0   ESTIMATED GFR   Result Value Ref Range    GFR (CKD-EPI) 97 >60 mL/min/1.73 m 2      All labs reviewed by  me. Labs were compared to prior labs if they were available. Significant for no leukocytosis, no anemia, normal electrolytes, normal renal function    RADIOLOGY  I have independently interpreted the diagnostic imaging associated with this visit and am waiting the final reading from the radiologist.   My preliminary interpretation is a follows: I do not appreciate on my interpretation any mediastinal free air or foreign bodies of the esophagus  Radiologist interpretation is as follows:  CT-CHEST (THORAX) WITH   Final Result         1.  Negative CT scan of the chest with contrast.   2.  Atherosclerosis   3.  Changes of hepatic steatosis          COURSE & MEDICAL DECISION MAKING  Nursing notes, VS, PMSFHx, labs, imaging, EKG reviewed in chart.    Ddx: Esophageal food obstruction, esophageal foreign body, mediastinal tear    2:53 AM - Patient seen and examined at bedside. Discussed plan of care, including examining the patient's throat for any obstructions. Patient agrees to the plan of care.     MDM: 2:43 AM Luis Handley is a 62 y.o. male who presented with concern for severe, acute esophageal foreign body with chicken bone.  Patient was eating chicken earlier tonight, felt something get caught in his throat and had pain.  Spit up to dime size clots of blood.  Went to [place] emergency department at outpatient facility and was sent here for further evaluation and GI consultation.  Upon arrival here patient is tolerating secretions and does not appear to have a food bolus obstruction.  His vital signs show hypertension but otherwise unremarkable.  Lungs are clear, physical exam is benign.  I did a full evaluation under direct laryngoscopy of the posterior pharynx and unfortunately not able to appreciate any foreign bodies or abnormalities.  Patient feels like something is stuck in his lower esophagus.  I discussed with the radiology team who recommends doing CT chest with contrast.  I tried to page GI several  times, but there is concern that she may have slept through the text as they have not been read.  Ultimately I discussed with the hospital team regarding admission and nonemergent GI consultation as patient is stable, they are amenable.  Patient updated he will admitted and is amenable. All labs reviewed by me. Labs were compared to prior labs if they were available. Significant for no leukocytosis, no anemia, normal electrolytes, normal renal function.  CT imaging of the chest shows negative for mediastinal air or foreign body.  Patient resting comfortably, in no acute distress verbalized understanding plan for admission.    ADDITIONAL PROBLEM LIST AND DISPOSITION    I have discussed management of the patient with the following physicians and VANESSA's: Hospitalist team    Discussion of management with other QHP or appropriate source(s): Radiologist advised to order CT scan with IV contrast.      FINAL IMPRESSION  1. Esophageal foreign body, initial encounter Acute   2. Chicken bone in esophagus Acute      Zhao TONG (Scribginger), am scribing for, and in the presence of, Taye Hill.    Electronically signed by: Zhao King (Unique), 3/10/2023    ITaye personally performed the services described in this documentation, as scribed by Zhao King in my presence, and it is both accurate and complete.    The note accurately reflects work and decisions made by me.  Taye Hill  3/10/2023  4:57 AM

## 2023-03-10 NOTE — PROGRESS NOTES
AA&Ox4. Denies CP/SOB.  No complaints of pain  Educated patient regarding pharmacologic and non pharmacologic modalities for pain management.  Skin per flowsheet.  Patient is NPO. Denies N/V.  + void. Last BM PTA.  Pt ambulates upself.  All needs met at this time. Call light within reach. Pt calls appropriately. Bed low and locked, non skid socks in place. Hourly rounding in place.

## 2023-03-10 NOTE — OR NURSING
0905 - Pt to PACU 1 from OR.  Bedside report from anesthesiologist and RN.  Attached to monitoring, VSS, breathing is calm and unlabored.      0918- Pt awake, resting comfortably, unlabored breathing on RA, called and gave report to Dania TORRES.    0946- Pt transferred to floor in [facility] by transport.

## 2023-03-10 NOTE — DISCHARGE SUMMARY
Discharge Summary    CHIEF COMPLAINT ON ADMISSION  Chief Complaint   Patient presents with    Other       Reason for Admission  Other     Admission Date  3/10/2023    CODE STATUS  Full Code    HPI & HOSPITAL COURSE  This is a 62 y.o. male here with BPH and GERD who ate some chicken with apparent small bones in it. After consuming this meal, he noticed he had pain and issues with swallowing. He presented to an outside ER and was transferred to our hospital for higher level of care. CT thorax was negative. EGD was done by GI which showed no evidence of a foreign object. However, patient continued to have a sensation in his neck. CT neck non-contrast was normal. ENT was consulted and he underwent bedside laryngoscopy which was normal as well. Patient was able to tolerate some food without issue and was deemed safe for discharge home with soft food for a few days. Patient was given strict ED return precautions including hard time swallowing, fevers, or shortness of breath.       Therefore, he is discharged in good and stable condition to home with close outpatient follow-up.      Discharge Date  3/10/2023    FOLLOW UP ITEMS POST DISCHARGE  F/U with PCP in 1-2 weeks    DISCHARGE DIAGNOSES  Principal Problem:    Esophageal foreign body POA: Unknown  Resolved Problems:    * No resolved hospital problems. *      FOLLOW UP  No future appointments.  No follow-up provider specified.    MEDICATIONS ON DISCHARGE     Medication List        CONTINUE taking these medications        Instructions   BENADRYL PO   Take  by mouth.     gabapentin 300 MG Caps  Commonly known as: NEURONTIN   Take 1 Capsule by mouth 3 times a day. Start with 300mg PO nightly, Day 2 start 300mg PO BID, Day 7 start 300mg PO TID, then increase each dose (one at a time) every 5 days by 300mg to 900mg TID maximum  Dose: 300 mg     hydrOXYzine HCl 25 MG Tabs  Commonly known as: ATARAX   Take 1 Tab by mouth 3 times a day as needed for Anxiety.  Dose: 25 mg    "  omeprazole 20 MG delayed-release capsule  Commonly known as: PRILOSEC   Take 20 mg by mouth every day.  Dose: 20 mg     oxyCODONE-acetaminophen 7.5-325 MG per tablet  Commonly known as: PERCOCET   Take 1 Tab by mouth every four hours as needed.  Dose: 1 Tablet     ZANTAC 75 PO   Take 1 Tab by mouth 2 Times a Day.  Dose: 1 Tablet              Allergies  Allergies   Allergen Reactions    Pcn [Penicillins] Unspecified     Pt states \"As a child, I go into convulsions\".      Gluten Meal     Morphine      \"sensitivity\"       DIET  Orders Placed This Encounter   Procedures    Diet Order Diet: Level 6 - Soft and Bite Sized; Liquid level: Level 0 - Thin     Standing Status:   Standing     Number of Occurrences:   1     Order Specific Question:   Diet:     Answer:   Level 6 - Soft and Bite Sized [23]     Order Specific Question:   Liquid level     Answer:   Level 0 - Thin       ACTIVITY  As tolerated.  Weight bearing as tolerated    CONSULTATIONS  ENT, GI    PROCEDURES  CT-SOFT TISSUE NECK W/O   Final Result      1.  No radiopaque foreign body.   2.  No acute abnormality is visualized.      CT-CHEST (THORAX) WITH   Final Result         1.  Negative CT scan of the chest with contrast.   2.  Atherosclerosis   3.  Changes of hepatic steatosis        As noted above    LABORATORY  Lab Results   Component Value Date    SODIUM 136 03/10/2023    POTASSIUM 4.2 03/10/2023    CHLORIDE 100 03/10/2023    CO2 21 03/10/2023    GLUCOSE 97 03/10/2023    BUN 27 (H) 03/10/2023    CREATININE 0.86 03/10/2023    CREATININE 0.9 02/06/2009        Lab Results   Component Value Date    WBC 6.2 03/10/2023    HEMOGLOBIN 15.7 03/10/2023    HEMATOCRIT 46.9 03/10/2023    PLATELETCT 169 03/10/2023        Total time of the discharge process exceeds 43 minutes.  "

## 2023-03-10 NOTE — PROGRESS NOTES
Pt arrived to unit via transport, pt ambulated from [facility] to hospital bed independently.  A0x4  Pt declines any SOB on room air, chest pain, new onset of numbness/tingling  Pt rates pain at 0/10, on a scale of 1-10  + voiding   Pt has + flatus, + bowel sounds, BM PTA  Pt ambulates independently  Pt is NPO strict, denies nausea/vomiting  Plan of care discussed, all questions answered.Call light is within reach, treaded slipper socks on, bed in lowest/locked position, hourly rounding in place, all needs met at this time.

## 2023-03-10 NOTE — H&P
Hospital Medicine History & Physical Note    Date of Service  3/10/2023    Primary Care Physician  Pcp Pt States None    Consultants  none    Specialist Names: none    Code Status  Full Code    Chief Complaint  Chief Complaint   Patient presents with    Other       History of Presenting Illness  Luis Handley is a 62 y.o. male past medical history of BPH who presented 3/10/2023 to [place] with chief complaint of food being stuck in throat.  Patient reports he feels like he has 2 chicken bone stuck deep in his throat.  He states he can feel them when he tries to swallow his saliva.  Patient transferred here as CT and GI unavailable at their facility.    I discussed the plan of care with patient.    Review of Systems  Review of Systems   Constitutional:  Negative for chills and fever.   HENT:  Negative for hearing loss and tinnitus.    Eyes:  Negative for blurred vision and double vision.   Respiratory:  Negative for cough and hemoptysis.    Cardiovascular:  Negative for chest pain and palpitations.   Gastrointestinal:  Negative for heartburn and nausea.   Genitourinary:  Negative for dysuria and urgency.   Musculoskeletal:  Negative for myalgias and neck pain.   Skin:  Negative for rash.   Neurological:  Negative for dizziness and headaches.   Endo/Heme/Allergies:  Does not bruise/bleed easily.   Psychiatric/Behavioral:  Negative for depression and suicidal ideas.      Past Medical History   has a past medical history of Cancer (HCC) and Psychiatric disorder.    Surgical History   has a past surgical history that includes stump revision (Left, 4/13/2016).     Family History    Family history reviewed with patient. There is no family history that is pertinent to the chief complaint.     Social History   reports that he has quit smoking. He has never used smokeless tobacco. He reports current alcohol use. He reports that he does not use drugs.    Allergies  Allergies   Allergen Reactions    Pcn [Penicillins]  "Unspecified     Pt states \"As a child, I go into convulsions\".      Gluten Meal     Morphine      \"sensitivity\"       Medications  Prior to Admission Medications   Prescriptions Last Dose Informant Patient Reported? Taking?   DiphenhydrAMINE HCl (BENADRYL PO)   Yes No   Sig: Take  by mouth.   Ranitidine HCl (ZANTAC 75 PO)  Patient Yes No   Sig: Take 1 Tab by mouth 2 Times a Day.   gabapentin (NEURONTIN) 300 MG Cap   No No   Sig: Take 1 Capsule by mouth 3 times a day. Start with 300mg PO nightly, Day 2 start 300mg PO BID, Day 7 start 300mg PO TID, then increase each dose (one at a time) every 5 days by 300mg to 900mg TID maximum   hydrOXYzine HCl (ATARAX) 25 MG Tab   No No   Sig: Take 1 Tab by mouth 3 times a day as needed for Anxiety.   omeprazole (PRILOSEC) 20 MG delayed-release capsule  Patient Yes No   Sig: Take 20 mg by mouth every day.   oxycodone-acetaminophen (PERCOCET) 7.5-325 MG per tablet   No No   Sig: Take 1 Tab by mouth every four hours as needed.      Facility-Administered Medications: None       Physical Exam  Temp:  [36.9 °C (98.5 °F)] 36.9 °C (98.5 °F)  Pulse:  [71-84] 79  Resp:  [16-19] 19  BP: (140-157)/() 157/100  SpO2:  [93 %-94 %] 93 %  Blood Pressure: (!) 157/100   Temperature: 36.9 °C (98.5 °F)   Pulse: 79   Respiration: 19   Pulse Oximetry: 93 %       Physical Exam  Vitals and nursing note reviewed.   Constitutional:       Appearance: Normal appearance.   HENT:      Head: Normocephalic and atraumatic.      Right Ear: Tympanic membrane normal.      Left Ear: Tympanic membrane normal.      Nose: Nose normal.      Mouth/Throat:      Mouth: Mucous membranes are moist.      Pharynx: Oropharynx is clear.   Eyes:      Extraocular Movements: Extraocular movements intact.      Pupils: Pupils are equal, round, and reactive to light.   Cardiovascular:      Rate and Rhythm: Normal rate and regular rhythm.      Pulses: Normal pulses.      Heart sounds: Normal heart sounds.   Pulmonary:      Effort: " Pulmonary effort is normal. No respiratory distress.      Breath sounds: Normal breath sounds. No stridor.   Abdominal:      General: Bowel sounds are normal. There is no distension.      Palpations: Abdomen is soft. There is no mass.   Musculoskeletal:         General: No swelling or tenderness. Normal range of motion.      Cervical back: Neck supple.   Skin:     General: Skin is warm.      Capillary Refill: Capillary refill takes less than 2 seconds.      Coloration: Skin is not jaundiced or pale.   Neurological:      General: No focal deficit present.      Mental Status: He is alert and oriented to person, place, and time. Mental status is at baseline.   Psychiatric:         Mood and Affect: Mood normal.         Behavior: Behavior normal.       Laboratory:  Recent Labs     03/10/23  0406   WBC 6.2   RBC 5.37   HEMOGLOBIN 15.7   HEMATOCRIT 46.9   MCV 87.3   MCH 29.2   MCHC 33.5*   RDW 41.1   PLATELETCT 169   MPV 9.9     Recent Labs     03/10/23  0406   SODIUM 136   POTASSIUM 4.2   CHLORIDE 100   CO2 21   GLUCOSE 97   BUN 27*   CREATININE 0.86   CALCIUM 10.6*     Recent Labs     03/10/23  0406   GLUCOSE 97         No results for input(s): NTPROBNP in the last 72 hours.      No results for input(s): TROPONINT in the last 72 hours.    Imaging:  CT-CHEST (THORAX) WITH    (Results Pending)       no X-Ray or EKG requiring interpretation    Assessment/Plan:  Justification for Admission Status  I anticipate this patient is appropriate for observation status at this time because possible food impaction requiring endoscopy.    Patient will need a Med/Surg bed on MEDICAL service .  The need is secondary to see above.    * Esophageal foreign body  Assessment & Plan  NPO   CT Chest ordered f/u results   GI consultation in am   Aspiration precautions  IV Fluids           VTE prophylaxis: SCDs/TEDs

## 2023-03-10 NOTE — ANESTHESIA PREPROCEDURE EVALUATION
Case: [id number] Anesthesia Start Date/Time: 03/10/23 0831    Procedures:       GASTROSCOPY with FOOD IMPACTION (Esophagus)      GASTROSCOPY, WITH BIOPSY (Esophagus)    Anesthesia type: General    Pre-op diagnosis: Food Impaction    Location: [facility] ROOM 26 / SURGERY SAME DAY [facility]    Surgeons: Elba Grande M.D.          Relevant Problems   No relevant active problems       Physical Exam    Airway   Mallampati: II  TM distance: >3 FB  Neck ROM: full       Cardiovascular   Rhythm: regular     Dental   Comments: Some chipped         Pulmonary   Breath sounds clear to auscultation     Abdominal   Abdomen: soft     Neurological              Anesthesia Plan    ASA 2       Plan - general               Induction: intravenous      Pertinent diagnostic labs and testing reviewed    Informed Consent:    Anesthetic plan and risks discussed with patient.

## 2023-03-11 NOTE — PROGRESS NOTES
Discharge Lounge order placed and patient educated. Care plan and patient education completed. All belongings returned to patient. Medication delivered to bedside. Patient transported via wheelchair to discharge lounge. Family instructed to pickup patient at [facility].

## 2023-03-11 NOTE — CARE PLAN
The patient is Stable - Low risk of patient condition declining or worsening    Shift Goals  Clinical Goals: plan of care updates  Patient Goals: plan of care updates    Progress made toward(s) clinical / shift goals:  patient discharged    Patient is not progressing towards the following goals:

## (undated) DEVICE — CANISTER SUCTION RIGID RED 1500CC (40EA/CA)

## (undated) DEVICE — SET LEADWIRE 5 LEAD BEDSIDE DISPOSABLE ECG (1SET OF 5/EA)

## (undated) DEVICE — TUBE E-T HI-LO CUFF 7.0MM (10EA/PK)

## (undated) DEVICE — KIT CUSTOM PROCEDURE SINGLE FOR ENDO (15/CA)

## (undated) DEVICE — TUBE CONNECTING SUCTION - CLEAR PLASTIC STERILE 72 IN (50EA/CA)

## (undated) DEVICE — FORCEP RADIAL JAW 4 STANDARD CAPACITY W/NEEDLE 240CM (40EA/BX)

## (undated) DEVICE — CONTAINER, SPECIMEN, STERILE

## (undated) DEVICE — FILM CASSETTE ENDO

## (undated) DEVICE — WATER IRRIGATION STERILE 1000ML (12EA/CA)

## (undated) DEVICE — KIT ANESTHESIA W/CIRCUIT & 3/LT BAG W/FILTER (20EA/CA)

## (undated) DEVICE — TOWEL STOP TIMEOUT SAFETY FLAG (40EA/CA)

## (undated) DEVICE — MANIFOLD NEPTUNE 1 PORT (20/PK)

## (undated) DEVICE — SENSOR OXIMETER ADULT SPO2 RD SET (20EA/BX)

## (undated) DEVICE — ELECTRODE 850 FOAM ADHESIVE - HYDROGEL RADIOTRNSPRNT (50/PK)

## (undated) DEVICE — BITE BLOCK, DISP.